# Patient Record
Sex: FEMALE | Race: WHITE | NOT HISPANIC OR LATINO | URBAN - METROPOLITAN AREA
[De-identification: names, ages, dates, MRNs, and addresses within clinical notes are randomized per-mention and may not be internally consistent; named-entity substitution may affect disease eponyms.]

---

## 2018-03-29 ENCOUNTER — IMPORTED ENCOUNTER (OUTPATIENT)
Dept: URBAN - METROPOLITAN AREA CLINIC 50 | Facility: CLINIC | Age: 68
End: 2018-03-29

## 2018-05-04 ENCOUNTER — IMPORTED ENCOUNTER (OUTPATIENT)
Dept: URBAN - METROPOLITAN AREA CLINIC 50 | Facility: CLINIC | Age: 68
End: 2018-05-04

## 2018-05-22 ENCOUNTER — IMPORTED ENCOUNTER (OUTPATIENT)
Dept: URBAN - METROPOLITAN AREA CLINIC 50 | Facility: CLINIC | Age: 68
End: 2018-05-22

## 2018-05-23 ENCOUNTER — IMPORTED ENCOUNTER (OUTPATIENT)
Dept: URBAN - METROPOLITAN AREA CLINIC 50 | Facility: CLINIC | Age: 68
End: 2018-05-23

## 2018-06-04 ENCOUNTER — IMPORTED ENCOUNTER (OUTPATIENT)
Dept: URBAN - METROPOLITAN AREA CLINIC 50 | Facility: CLINIC | Age: 68
End: 2018-06-04

## 2019-05-16 ENCOUNTER — IMPORTED ENCOUNTER (OUTPATIENT)
Dept: URBAN - METROPOLITAN AREA CLINIC 50 | Facility: CLINIC | Age: 69
End: 2019-05-16

## 2019-05-16 NOTE — PATIENT DISCUSSION
"""CL Rx finalized.  Discussed ocular health
"""Informed patient that their cataract(s) are not visually significant or do not meet the criteria for cataract surgery.  Recommended attention to common cataract symptoms
Bernarda Schumacher RN--[phone]

## 2020-04-03 ENCOUNTER — IMPORTED ENCOUNTER (OUTPATIENT)
Dept: URBAN - METROPOLITAN AREA CLINIC 50 | Facility: CLINIC | Age: 70
End: 2020-04-03

## 2020-06-01 ENCOUNTER — IMPORTED ENCOUNTER (OUTPATIENT)
Dept: URBAN - METROPOLITAN AREA CLINIC 50 | Facility: CLINIC | Age: 70
End: 2020-06-01

## 2020-06-04 ENCOUNTER — IMPORTED ENCOUNTER (OUTPATIENT)
Dept: URBAN - METROPOLITAN AREA CLINIC 50 | Facility: CLINIC | Age: 70
End: 2020-06-04

## 2020-06-16 ENCOUNTER — IMPORTED ENCOUNTER (OUTPATIENT)
Dept: URBAN - METROPOLITAN AREA CLINIC 50 | Facility: CLINIC | Age: 70
End: 2020-06-16

## 2021-04-18 ASSESSMENT — TONOMETRY
OS_IOP_MMHG: 16
OD_IOP_MMHG: 16
OD_IOP_MMHG: 14
OS_IOP_MMHG: 16
OD_IOP_MMHG: 15
OS_IOP_MMHG: 15
OD_IOP_MMHG: 16
OS_IOP_MMHG: 15

## 2021-04-18 ASSESSMENT — VISUAL ACUITY
OD_CC: 20/25-2
OS_BAT: 20/50
OS_CC: 20/25
OD_CC: 20/30
OS_CC: 20/25-2
OD_CC: J1@ 14 IN
OD_BAT: 20/100
OD_CC: J1
OS_CC: J5
OS_CC: 20/30
OD_CC: J5
OD_CC: 20/30
OS_PH: 20/30
OS_CC: J1-@ 17 IN
OD_BAT: 20/50
OS_BAT: 20/50
OD_CC: J2@ 17 IN
OD_OTHER: 20/50. 20/400.
OS_OTHER: 20/50. >20/400.
OS_CC: 20/40
OS_CC: J1@ 14 IN
OD_CC: J1-@ 17 IN
OS_CC: J2@ 17 IN
OS_CC: J1
OS_CC: 20/30
OD_CC: 20/25
OD_OTHER: 20/100. >20/400.
OS_OTHER: 20/50. 20/400.
OD_CC: 20/40+2

## 2021-07-16 ENCOUNTER — PREPPED CHART (OUTPATIENT)
Dept: URBAN - METROPOLITAN AREA CLINIC 52 | Facility: CLINIC | Age: 71
End: 2021-07-16

## 2021-07-23 ENCOUNTER — ROUTINE EXAM (OUTPATIENT)
Dept: URBAN - METROPOLITAN AREA CLINIC 50 | Facility: CLINIC | Age: 71
End: 2021-07-23

## 2021-07-23 DIAGNOSIS — H25.13: ICD-10-CM

## 2021-07-23 DIAGNOSIS — H18.593: ICD-10-CM

## 2021-07-23 DIAGNOSIS — Z01.01: ICD-10-CM

## 2021-07-23 PROCEDURE — 92014 COMPRE OPH EXAM EST PT 1/>: CPT

## 2021-07-23 PROCEDURE — 92015 DETERMINE REFRACTIVE STATE: CPT

## 2021-07-23 ASSESSMENT — VISUAL ACUITY
OD_CC: 20/25
OU_CC: 20/20-1
OS_GLARE: 20/25
OS_CC: 20/20-1
OD_GLARE: 20/200
OS_GLARE: 20/40
OD_GLARE: >20/400
OU_CC: J1@16IN

## 2021-07-23 ASSESSMENT — TONOMETRY
OD_IOP_MMHG: 17
OS_IOP_MMHG: 17

## 2021-08-16 ENCOUNTER — CONTACT LENS FITTING (OUTPATIENT)
Dept: URBAN - METROPOLITAN AREA CLINIC 50 | Facility: CLINIC | Age: 71
End: 2021-08-16

## 2021-08-16 DIAGNOSIS — H52.4: ICD-10-CM

## 2021-08-16 PROCEDURE — CLF EW SOF CL FIT, EW, SOFT, SPH

## 2021-08-16 ASSESSMENT — VISUAL ACUITY
OS_CC: 20/30-1
OD_CC: 20/30
OU_CC: 20/25-1

## 2021-08-16 NOTE — PATIENT DISCUSSION
Will order with loose lens ORx trials to see if she appreciates the change. She can call to finalize which ever prescription she prefers.

## 2022-05-25 ENCOUNTER — ESTABLISHED PATIENT (OUTPATIENT)
Dept: URBAN - METROPOLITAN AREA CLINIC 52 | Facility: CLINIC | Age: 72
End: 2022-05-25

## 2022-05-25 DIAGNOSIS — Z97.3: ICD-10-CM

## 2022-05-25 DIAGNOSIS — H52.4: ICD-10-CM

## 2022-05-25 DIAGNOSIS — Z01.01: ICD-10-CM

## 2022-05-25 PROCEDURE — 92310-1E ESTABLISHED CL PATIENT SPHERICAL SINGLE VISION SOFT LENS EVALUATION

## 2022-05-25 PROCEDURE — 92015 DETERMINE REFRACTIVE STATE: CPT

## 2022-05-25 PROCEDURE — 92014 COMPRE OPH EXAM EST PT 1/>: CPT

## 2022-05-25 ASSESSMENT — KERATOMETRY
OS_K2POWER_DIOPTERS: 42.50
OS_K1POWER_DIOPTERS: 42.25
OD_K1POWER_DIOPTERS: 41.75
OD_K2POWER_DIOPTERS: 42.50
OS_AXISANGLE2_DEGREES: 26
OD_AXISANGLE2_DEGREES: 13
OS_AXISANGLE_DEGREES: 116
OD_AXISANGLE_DEGREES: 103

## 2022-05-25 ASSESSMENT — VISUAL ACUITY
OD_CC: 20/30-2
OS_CC: 20/20
OS_CC: 20/20-2
OD_PH: 20/25
OD_CC: 20/30

## 2023-08-15 ENCOUNTER — COMPREHENSIVE EXAM (OUTPATIENT)
Dept: URBAN - METROPOLITAN AREA CLINIC 50 | Facility: LOCATION | Age: 73
End: 2023-08-15

## 2023-08-15 DIAGNOSIS — H02.831: ICD-10-CM

## 2023-08-15 DIAGNOSIS — H02.834: ICD-10-CM

## 2023-08-15 DIAGNOSIS — Z97.3: ICD-10-CM

## 2023-08-15 DIAGNOSIS — H52.4: ICD-10-CM

## 2023-08-15 DIAGNOSIS — H18.593: ICD-10-CM

## 2023-08-15 DIAGNOSIS — H25.13: ICD-10-CM

## 2023-08-15 DIAGNOSIS — Z01.01: ICD-10-CM

## 2023-08-15 PROCEDURE — 92015 DETERMINE REFRACTIVE STATE: CPT

## 2023-08-15 PROCEDURE — 92014 COMPRE OPH EXAM EST PT 1/>: CPT

## 2023-08-15 ASSESSMENT — VISUAL ACUITY
OD_PH: 20/25-2
OD_CC: 20/30
OD_GLARE: 20/30
OS_GLARE: 20/30
OU_CC: 20/25-1
OU_CC: J7@14"
OD_GLARE: 20/40
OS_GLARE: 20/50
OS_CC: 20/25-2

## 2023-08-15 ASSESSMENT — KERATOMETRY
OS_K1POWER_DIOPTERS: 42.25
OD_AXISANGLE2_DEGREES: 90
OD_AXISANGLE_DEGREES: 180
OS_AXISANGLE_DEGREES: 165
OD_K1POWER_DIOPTERS: 42.25
OS_AXISANGLE2_DEGREES: 75
OS_K2POWER_DIOPTERS: 42.50
OD_K2POWER_DIOPTERS: 42.25

## 2023-08-15 ASSESSMENT — TONOMETRY
OS_IOP_MMHG: 16
OD_IOP_MMHG: 17

## 2024-02-29 ENCOUNTER — ESTABLISHED PATIENT (OUTPATIENT)
Dept: URBAN - METROPOLITAN AREA CLINIC 49 | Facility: LOCATION | Age: 74
End: 2024-02-29

## 2024-02-29 DIAGNOSIS — Z97.3: ICD-10-CM

## 2024-02-29 PROCEDURE — 99212 OFFICE O/P EST SF 10 MIN: CPT

## 2024-02-29 ASSESSMENT — TONOMETRY
OS_IOP_MMHG: 14
OD_IOP_MMHG: 15

## 2024-02-29 ASSESSMENT — KERATOMETRY
OD_AXISANGLE2_DEGREES: 90
OS_K2POWER_DIOPTERS: 42.50
OS_AXISANGLE_DEGREES: 165
OS_K1POWER_DIOPTERS: 42.25
OD_AXISANGLE_DEGREES: 180
OS_AXISANGLE2_DEGREES: 75
OD_K2POWER_DIOPTERS: 42.25
OD_K1POWER_DIOPTERS: 42.25

## 2024-02-29 ASSESSMENT — VISUAL ACUITY
OD_CC: 20/30
OU_CC: 20/20-1
OS_CC: 20/25
OD_PH: 20/25

## 2024-12-27 ENCOUNTER — COMPREHENSIVE EXAM (OUTPATIENT)
Age: 74
End: 2024-12-27

## 2024-12-27 DIAGNOSIS — H25.13: ICD-10-CM

## 2024-12-27 DIAGNOSIS — Z97.3: ICD-10-CM

## 2024-12-27 DIAGNOSIS — Z01.01: ICD-10-CM

## 2024-12-27 DIAGNOSIS — H52.4: ICD-10-CM

## 2024-12-27 PROCEDURE — 92015 DETERMINE REFRACTIVE STATE: CPT

## 2024-12-27 PROCEDURE — 92310-1 LEVEL 1 SOFT LENS UPDATE

## 2024-12-27 PROCEDURE — 92014 COMPRE OPH EXAM EST PT 1/>: CPT

## 2025-04-09 ENCOUNTER — CONSULTATION/EVALUATION (OUTPATIENT)
Age: 75
End: 2025-04-09

## 2025-04-09 DIAGNOSIS — H02.831: ICD-10-CM

## 2025-04-09 DIAGNOSIS — H25.13: ICD-10-CM

## 2025-04-09 DIAGNOSIS — Z97.3: ICD-10-CM

## 2025-04-09 DIAGNOSIS — H02.834: ICD-10-CM

## 2025-04-09 DIAGNOSIS — H18.593: ICD-10-CM

## 2025-04-09 DIAGNOSIS — H53.021: ICD-10-CM

## 2025-04-09 DIAGNOSIS — H52.4: ICD-10-CM

## 2025-04-09 DIAGNOSIS — H10.13: ICD-10-CM

## 2025-04-09 PROCEDURE — 99214 OFFICE O/P EST MOD 30 MIN: CPT

## 2025-04-09 PROCEDURE — 92015 DETERMINE REFRACTIVE STATE: CPT

## 2025-04-09 PROCEDURE — 92134 CPTRZ OPH DX IMG PST SGM RTA: CPT | Mod: NC
